# Patient Record
(demographics unavailable — no encounter records)

---

## 2017-03-21 NOTE — EDPHY
H & P


Stated Complaint: L pinky lac vs chair


Time Seen by Provider: 03/21/17 19:09


HPI/ROS: 





CHIEF COMPLAINT:  Left finger injury





HISTORY OF PRESENT ILLNESS:  Patient is a 12-year-old boy who comes to the 

emergency department mom with a crush injury to his left little finger.  He was 

sitting on a folding metal chair when it collapsed and crushed his finger 

between it.  This happened just prior to arrival.  He denies other injuries.  

He is up-to-date on his tetanus.








REVIEW OF SYSTEMS:


Constitutional:  denies: chills, fever, recent illness, recent injury


EENTM: denies: blurred vision, double vision, nose congestion


Respiratory: denies: cough, shortness of breath


Cardiac: denies: chest pain, irregular heart rate, lightheadedness, palpitations


Gastrointestinal/Abdominal: denies: abdominal pain, diarrhea, nausea, vomiting, 

blood streaked stools


Genitourinary: denies: dysuria, frequency, hematuria, pain


Musculoskeletal:  See HPI


Skin:  See HPI


Neurological: denies: headache, numbness, paresthesia, tingling, dizziness, 

weakness


Hematologic/Lymphatic: denies: blood clots, easy bleeding, easy bruising


Immunologic/allergic: denies: HIV/AIDS, transplant








EXAM:


GENERAL:  Well-appearing, well-nourished and in no acute distress.


HEAD:  Atraumatic, normocephalic.


EYES:  Pupils equal round and reactive to light, extraocular movements intact, 

sclera anicteric, conjunctiva are normal.


ENT:  TMs normal, nares patent, oropharynx clear without exudates.  Moist 

mucous membranes.


NECK:  Normal range of motion, supple without lymphadenopathy or JVD.


LUNGS:  Breath sounds clear to auscultation bilaterally and equal.  No wheezes 

rales or rhonchi.


HEART:  Regular rate and rhythm without murmurs, rubs or gallops.


ABDOMEN:  Soft, nontender, normoactive bowel sounds.  No guarding, no rebound.  

No masses appreciated.


BACK:  No CVA tenderness, no spinal tenderness, step-offs or deformities


EXTREMITIES:  Crush injury to the tip of the left 5th finger.  Fingernail 

appears avulsed. 


NEUROLOGICAL:  Cranial nerves II through XII grossly intact.  Normal speech, 

normal gait.  5/5 strength, normal movement in all extremities, normal sensation


PSYCH:  Normal mood, normal affect.


SKIN:  Warm, dry, normal turgor, no visible rashes or lesions.








Source: Patient


Exam Limitations: No limitations





- Personal History


Current Tetanus Diphtheria and Acellular Pertussis (TDAP): Unsure





- Medical/Surgical History


Hx Asthma: No


Hx Chronic Respiratory Disease: No


Hx Diabetes: No


Hx Cardiac Disease: No


Hx Renal Disease: No


Hx Cirrhosis: No


Hx Alcoholism: No


Hx HIV/AIDS: No


Hx Splenectomy or Spleen Trauma: No


Other PMH: IBS





- Family History


Significant Family History: No pertinent family hx





- Social History


Smoking Status: Never smoked


Alcohol Use: Sober


Drug Use: None


Constitutional: 


 Initial Vital Signs











Temperature (C)  36.8 C   03/21/17 18:58


 


Heart Rate  90   03/21/17 18:58


 


Respiratory Rate  30   03/21/17 18:58


 


O2 Sat (%)  96   03/21/17 18:58








 











O2 Delivery Mode               Room Air














Allergies/Adverse Reactions: 


 





No Known Allergies Allergy (Verified 03/21/17 18:56)


 








Home Medications: 














 Medication  Instructions  Recorded


 


Amoxicillin  05/02/14


 


Amox Tr/K Clav (Augmentin) 500 mg PO Q8 #30 tab 03/21/17





[Augmentin 500/125 MG TAB (*)]  


 


Hydrocodone/APAP 5/325 [Norco 1 tab PO Q6H PRN #10 tab 03/21/17





5/325 (*)]  


 


[address] Thyroid  03/21/17


 


Zoloft 100mg (*)  03/21/17














Medical Decision Making





- Diagnostics


Imaging: 





X-ray:  Finger x-ray was obtained.  I viewed the images myself on the PACS 

system.  My interpretation of the images is:  Tuft fracture, comminuted.  The 

radiologist interpretation is pending.


Procedures: 





Procedure:  Laceration repair.





Verbal consent was obtained from the patient.  The finger tip of laceration was 

anesthetized with 0.5% bupivacaine digital block.  The wound was irrigated 

copiously according to protocol, draped and explored to its base.  It was 

approximately 1/2 cm deep.  with bony involvement.  No tendon, nerve, or 

vascular injury was identified when explored.  No foreign body was identified.  

The wound was repaired with 5.0 Prolene, 4 sutures, interrupted.  The wound 

repair was complex with flap realignment the fingernail was not removed.  No 

obvious subungual hematoma or nail bed laceration.  The procedure was performed 

by myself.  A dressing was then placed with sterile gauze and bacitracin.  The 

I also trephinated the patient's fingernail case hematoma develops.


ED Course/Re-evaluation: 





Patient has an open tuft fracture.  The finger nail is not avulsed but is 

attached to skin that is lacerated.  There is no obvious subungual hematoma.  

Is patient tolerated suture repair.  I will start him on antibiotics have him 

follow up with Hand surgery.  Studies verify that  there is no increased 

benefit for fingernail removal if it is in place.  Mode like to start the 

patient on his antibiotics now.  We do not have the lower dose Augmentin.  He 

is completely averse to taking liquid.  We will cut the adult pill in half.


Differential Diagnosis: 





Partial list of the Differential diagnosis considered include but were not 

limited to;  nail bed laceration, nail avulsion, subungual hematoma, tuft 

fracture, open fracture and although unlikely based on the history and physical 

exam, I also considered non accidental trauma, burn, foreign body.  I discussed 

these differential diagnoses and the plan with the mom as well as the usual and 

expected course.  The mom understands that the diagnosis is provisional and 

that in medicine we are not always correct and that further workup is often 

warranted.  Usual and customary warnings were given.  All of the mom's 

questions were answered.  The mom was instructed to return to the emergency 

department should the symptoms at all worsen or return, otherwise to followup 

with the physician as we discussed.





- Data Points


Medications Given: 


 








Discontinued Medications





Hydrocodone Bitart/Acetaminophen (Norco 5/325mg Prepack#6)  1 btl TAKEHOME 

EDNOW ONE


   Stop: 03/21/17 20:27


   Last Admin: 03/21/17 20:43 Dose:  1 btl


Amoxicillin/Clavulanate Potassium (Augmentin 875mg)  875 mg PO EDNOW ONE


   PRN Reason: Protocol


   Stop: 03/21/17 20:44


   Last Admin: 03/21/17 21:02 Dose:  437 mg


Ibuprofen (Motrin)  400 mg PO EDNOW ONE


   Stop: 03/21/17 19:13


   Last Admin: 03/21/17 19:21 Dose:  400 mg








Departure





- Departure


Disposition: Home, Routine, Self-Care


Clinical Impression: 


Open fracture of tuft of distal phalanx of finger


Qualifiers:


 Encounter type: initial encounter Qualified Code(s): S62.639B - Displaced 

fracture of distal phalanx of unspecified finger, initial encounter for open 

fracture





Condition: Fair


Instructions:  Hydrocodone/Acetaminophen (By mouth), Finger Fracture (ED), 

Finger Laceration (ED)


Referrals: 


Myles Fuller MD [Primary Care Provider] - As per Instructions


Duong Torres MD [Medical Doctor] - As per Instructions


Prescriptions: 


Amox Tr/K Clav (Augmentin) [Augmentin 500/125 MG TAB (*)] 500 mg PO Q8 #30 tab


Hydrocodone/APAP 5/325 [Norco 5/325 (*)] 1 tab PO Q6H PRN #10 tab


 PRN Reason: Pain, Severe